# Patient Record
Sex: MALE | Race: WHITE | Employment: FULL TIME | URBAN - METROPOLITAN AREA
[De-identification: names, ages, dates, MRNs, and addresses within clinical notes are randomized per-mention and may not be internally consistent; named-entity substitution may affect disease eponyms.]

---

## 2021-07-28 ENCOUNTER — TELEPHONE (OUTPATIENT)
Dept: PSYCHOLOGY | Facility: CLINIC | Age: 51
End: 2021-07-28

## 2021-07-28 NOTE — TELEPHONE ENCOUNTER
[facility] Telephone Intake    Date:  2021  Client Name:  Ludin Morales  Preferred Name:       MRN:  [id number]   :  1970       Age:  51 year old     Presenting Problem / Reason for Assessment   (Clinical History &Symptoms):     Patient wishes to address relationship with pornography use.    Suggested Program:  CSB    Length of time experiencing Symptoms: Since 12 y/o    Referral Source:  Through another therapist    Follow Up:    Insurance Benefits to be evaluated.  Note will be entered when validated.    Patient wishes to be contacted regarding Insurance benefits: Yes  Please Verify Registration

## 2021-07-29 ASSESSMENT — ANXIETY QUESTIONNAIRES
7. FEELING AFRAID AS IF SOMETHING AWFUL MIGHT HAPPEN: MORE THAN HALF THE DAYS
5. BEING SO RESTLESS THAT IT IS HARD TO SIT STILL: SEVERAL DAYS
GAD7 TOTAL SCORE: 11
2. NOT BEING ABLE TO STOP OR CONTROL WORRYING: SEVERAL DAYS
4. TROUBLE RELAXING: SEVERAL DAYS
3. WORRYING TOO MUCH ABOUT DIFFERENT THINGS: SEVERAL DAYS
6. BECOMING EASILY ANNOYED OR IRRITABLE: MORE THAN HALF THE DAYS
1. FEELING NERVOUS, ANXIOUS, OR ON EDGE: NEARLY EVERY DAY

## 2021-07-29 ASSESSMENT — PATIENT HEALTH QUESTIONNAIRE - PHQ9: SUM OF ALL RESPONSES TO PHQ QUESTIONS 1-9: 12

## 2021-07-30 ASSESSMENT — ANXIETY QUESTIONNAIRES: GAD7 TOTAL SCORE: 11

## 2021-08-08 ENCOUNTER — HEALTH MAINTENANCE LETTER (OUTPATIENT)
Age: 51
End: 2021-08-08

## 2021-08-09 ENCOUNTER — VIRTUAL VISIT (OUTPATIENT)
Dept: PSYCHOLOGY | Facility: CLINIC | Age: 51
End: 2021-08-09
Payer: COMMERCIAL

## 2021-08-09 DIAGNOSIS — F41.1 GENERALIZED ANXIETY DISORDER: ICD-10-CM

## 2021-08-09 DIAGNOSIS — F91.8 CONDUCT DISORDER, UNDIFFERENTIATED TYPE: Primary | ICD-10-CM

## 2021-08-09 DIAGNOSIS — F34.1 DYSTHYMIA: ICD-10-CM

## 2021-08-09 PROCEDURE — 99207 PR NO CHARGE LOS: CPT | Performed by: PSYCHOLOGIST

## 2021-08-09 PROCEDURE — 90791 PSYCH DIAGNOSTIC EVALUATION: CPT | Mod: 95 | Performed by: PSYCHOLOGIST

## 2021-08-09 NOTE — PROGRESS NOTES
[facility]  Program in Human Sexuality  Department of Family Medicine & Community Health  [facility]  [address]               [place] [address]  Phone: [phone]  Fax: [phone]  [email or web address]     Diagnostic Assessment Interview       8/09/21     Client Name: Ludin Morales  YOB: 1970  Age: 51 year old  MRN: [id number]  Gender/Gender Identity: male  Treating Provider: Aniyah Carmona, PhD LP  Program: CSB  Type of Session: Diagnostic Assessment  Present in Session: Patient  Number of Minutes: 50    Video start time:  5:00  Video end time: 5:55    Telemedicine Visit: The patient's condition can be safely assessed and treated via synchronous audio and visual telemedicine encounter.      Reason for Telemedicine Visit: Patient has requested telehealth visit    Originating Site (Patient Location): Patient's home    Distant Site (Provider Location): [facility] Clinics: [facility]    Consent:  The patient/guardian has verbally consented to: the potential risks and benefits of telemedicine (video visit) versus in person care; bill my insurance or make self-payment for services provided; and responsibility for payment of non-covered services.     Mode of Communication:  Video Conference via Doxy    As the provider I attest to compliance with applicable laws and regulations related to telemedicine.         Reviewed confidentiality, informed consent, and relevant [facility] policies.    Referral Source: through a therapist.    Cultural/Spiritual/Yarsani Considerations:   Patient is a 51 year old year old individual assigned male at birth who identifies as male. He identifies as a heterosexual male.. Patient denied any other culturally relevant factors.    Chief Complaint/ History of Presenting Problem and Goals:  Patient's problems with out of control, driven, impulsive, compulsive sexual behavior began when he was 12 years old.  He feels that this has  caused major stress in his marriage and all past relationships.  He believes that this has caused him to be depressed and irritable.    Patient made the following efforts to change problematic behavior through therapy starting in 2018.  He is currently in individual and group therapy.       As long as he can remember has been interested in pornography.  Always thought it was normal and it was something that all men did.    But now after 10 years of marriage, he now feels that it has has negatively affected their relationship.    He believes it hurt previuos relationships.    When he is involve in pornography does not feel a sense of need for relationship.  HIs wife feels pushed away.    Lots of lies to her.  He feels so much shame.    Has not looked at pornography for a while and feels that - since 4-5 months.  Looked at it 2 months ago and stopped.  That is feeling good.    Had an emotional affair that he was having at work that ended in May.  Lasted 6 weeks.  Hugged 4 times.  Kissed 4 times.  He ended it - they both agreed.  Administrators know about it.  They have not  them.  Not concerned about harrassment claim.    That resulted in couples therapy.    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    He has learned about his triggers.  Wants to learn more.  He wants to look more at his his background and learn more about himself.    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      Current Significant Relationship/Partner information:  Patient reported that the partner is aware of the following problematic behaviors:  yes.  Patient reported the partner became aware of these concerns through catching him numerous times despite pledges and promises..   Patient reported that partner's reaction to becoming aware of these problematic behaviors is that it has distracted from their intimate relatoinship.     Patient reported that the partner is not aware of the following problematic behaviors: none.   Patient reported that they are open to having their partner join them in some upcoming sessions.  Patient reported that they believe their partner is open to joining them in some upcoming sessions.     Endorsed problematic behaviors include:   Anonymous/multiple sexual encounters: No  Compulsive Fixation on unattainable partner: No  Obsessive fantasizing: No  Multiple love affairs: No  Compulsive sexual expression with primary partner: No  Compulsive masturbation: Yes  Internet pornography: Yes  Other types of pornography: Yes  Chat rooms, social media sites: No  Personals ads: No  Bath houses: No  Peep/strip shows: No  Phone sex: No  Prostitution: No  Sex in public places: No    Any paraphilic behaviors:  Voyeurism: No  Exhibitionism: No  Frotteurism: No  Sexual Masochism: No  Sexual Sadism: No  Pedophilia: No  Cross dressing: No  Fetishism: No  Coercive sexual behavior: No    Patient denies sexual orientation concerns.    Patient denies sexual functioning concerns.   ________________________________________________________________  Mental Health History:   Patient has not had a history of hospitalizations for mental health.  Patient has not had a history of suicide attempts.   Patient has not had a history of suicidal ideation.   Patient has had a history of previous psychotherapy.       Saw a therapist in 2018 and saw her for 1/ 1/2 years.  Was not all that helpful.  Then started with current therapist for about 4 months.  Started couples therapy in April.  She is in individual therapy with someone else.     Patient worked on these  during these sessions.   The patient reported this was somewhat helpful.    Has been dx with some anxiety.  Was diagnosed with adhd.    Was taking ritalin.  Was not helpful.  Hard to focus, procratinated, forget things all the time.  Feels that he has had that since childhood.    Has been  on lexapro- and wellbutrin  He feels that is helpful.       Patient has not had a history of mental health concerns in his family.      Substance Use:   Patient denies using nicotine products.   Patient has been using alcohol. Typically drinks 3 drinks a week - maybe 4.  Increased since COVID.  Denies abuse..  The patient denies using any other substances.   Patient denies any substance abuse issues (see CAGE score).  Patient denies a history of substance use concerns in his family.    Medical History:   Patient receives primary medical care from Hugh Curiel at [facility] in [place], [place].   Patient has identified the following medical conditions: in good health. Has some back concerns.  Patient is currently prescribed nothing other than his psych meds.  Patient reported the following history of surgeries: both shoulders (arthritis), vasectomy.     Relationships/Social History  First marriage.  Met at college.   for about 4 years.  It was difficult marriage.  Was looking at a lot of pornography.  Confict with her family.  That ended in divorce and then met current wife.    Family History:   Father: Feels many issues stem from relationship with parents.  Was never there for me.  Oblivious to what was going . No emotion.  No caring.  Very little communication. Has very little relationship with father.    Mother: Feels the same way about his mother.  Has very little relationship with mother.    Siblings: Always felt like the balck sheep in the family.  Felt he didn't belong.  [place] that from siblings.  Brother two years older.  Older brother 4 years very little connection.    Youngest sister and brother  - estranged.      Children: 4  10 year old daughter together.  She has a 14 and 17 year  He has an 18 year old (lving with her mother).  Getting more difficulty at they get older.   Hard to connect with them.  They are aware of the affair that he had.  That has been hard on them.    The patient denies  "extramarital  relationships.   Acknowledges having a recent emotional affair.    Trauma History:  The patient denies a history of trauma.     Educational History:   Patient completed a bachelors degree in education.    Occupational history:    Current/most recent position: special .       FINANCIAL RESOURCES:   The patient's financial situation is frustrating.  Had to keep up with bills.      HOUSING/LIVING SITUATION:   The patient lives at home with his wife, children and step children..    Legal Issues:   Patient denies legal issues.        ______________________________________________________________________     CONCLUSIONS    Strengths and Vulnerabilities:   He is bright, articulate.  He struggles with paying attention and is not a good listener.  He struggles with honesty.    Symptoms:    decreased self-esteem/guilt  irritable  decreased concentration/indecision  rumination  dysphoria  worry/anxiety  distractible  compulsive  relationship problems  occupational problems  other: compulsive sexual behavior    See PHQ 9 and JORDON 7 Scores    Mental Status:   Appearance:  No apparent distress and Casually dressed  Behavior/relationship to examiner/demeanor:  Cooperative, Engaged and Pleasant  Orientation: Oriented to person, place, time and situation  Speech Rate:  Normal   Speech Spontaneity:  Normal  Mood:  \"okay\", friendly, dysphoric, distraught, anxious, nervous, apprehensive and fearful  Affect:  Appropriate/mood-congruent and Subdued  Thought Process (Associations):  Logical, Linear and Goal directed  Thought Content:  no evidence of suicidal or homicidal ideation  Abnormal Perception:  None  Attention/Concentration:  Normal  Language:  Intact  Insight:  Adequate  Judgment:  Fair        DSM-5 Diagnosis: 312.89 (F91.8) Other Specified Disruptive, Impulse Control, and Conduct Disorder (Hypersexual Disorder)        Conclusions/Recommendations/Initial Treatment Goals:   Based on the patient's report of " symptoms, patient meets criteria for compulsive sexual behavior disorder, dysthymia and generalized anxiety. . The patient's mental health concerns affect patient's ability to function in intimate relationships and have been causing clinically significant distress. The patient reports minimal drug/alcohol use. The patient is also struggling with relationship conflicts. Patient denies safety concerns. Based on the patient's reported symptoms and impact on functioning, the plan for the patient is:    Consider encouraging the partner to complete a diagnostic assessment and consider any recommended treatment.    Interactive Complexity  Communication difficulties present during the current psychiatric procedure included:    None     Aniyah Carmona, PhD, LP

## 2021-08-23 ENCOUNTER — VIRTUAL VISIT (OUTPATIENT)
Dept: PSYCHOLOGY | Facility: CLINIC | Age: 51
End: 2021-08-23
Payer: COMMERCIAL

## 2021-08-23 DIAGNOSIS — F41.1 GENERALIZED ANXIETY DISORDER: ICD-10-CM

## 2021-08-23 DIAGNOSIS — F98.8 ATTENTION DEFICIT DISORDER (ADD) WITHOUT HYPERACTIVITY: ICD-10-CM

## 2021-08-23 DIAGNOSIS — F91.8 CONDUCT DISORDER, UNDIFFERENTIATED TYPE: Primary | ICD-10-CM

## 2021-08-23 DIAGNOSIS — F34.1 DYSTHYMIA: ICD-10-CM

## 2021-08-23 PROCEDURE — 90837 PSYTX W PT 60 MINUTES: CPT | Mod: 95 | Performed by: PSYCHOLOGIST

## 2021-08-23 PROCEDURE — 99207 PR NO CHARGE LOS: CPT | Performed by: PSYCHOLOGIST

## 2021-08-23 NOTE — PROGRESS NOTES
[facility]     Case Progress Note      8/23/21    Client Name: Ludin Morales  YOB: 1970  MRN:  [id number]  Treating Provider: Aniyah Carmona, PhD LP  Type of Session: Individual  Present in Session: Patient and wife  Number of Minutes:  50    Video start time: 3:00  Video end time: 3:55    Telemedicine Visit: The patient's condition can be safely assessed and treated via synchronous audio and visual telemedicine encounter.      Reason for Telemedicine Visit: Services only offered telehealth    Originating Site (Patient Location): Patient's home    Distant Site (Provider Location): [facility] Clinics: [facility]    Consent:  The patient/guardian has verbally consented to: the potential risks and benefits of telemedicine (video visit) versus in person care; bill my insurance or make self-payment for services provided; and responsibility for payment of non-covered services.     Mode of Communication:  Video Conference via Doxyl    As the provider I attest to compliance with applicable laws and regulations related to telemedicine.        Current Symptoms/Status:  Patient's problems with out of control, driven, impulsive, compulsive sexual behavior began when he was 12 years old.  He feels that this has caused major stress in his marriage and all past relationships.  He believes that this has caused him to be depressed and irritable.    Patient made the following efforts to change problematic behavior through therapy starting in 2018.  He is currently in individual and group therapy.       As long as he can remember has been interested in pornography.  Always thought it was normal and it was something that all men did.    But now after 10 years of marriage, he now feels that it has has negatively affected their relationship.    He believes it hurt previuos relationships.    When he is involve in pornography does not feel a sense of need for relationship.  HIs wife feels pushed away.    Lots  of lies to her.  He feels so much shame.    Has not looked at pornography for a while and feels that - since 4-5 months.  Looked at it 2 months ago and stopped.  That is feeling good.    Had an emotional affair that he was having at work that ended in May.  Lasted 6 weeks.  Hugged 4 times.  Kissed 4 times.  He ended it - they both agreed.  Administrators know about it.  They have not  them.  Not concerned about harrassment claim.    That resulted in couples therapy.    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    He has learned about his triggers.  Wants to learn more.  He wants to look more at his his background and learn more about himself.    Lasted 6 weeks.  Hugged 4 times.  Kissed 4 times.  He ended it - they both agreed.  Administrators know about it.  They have not  them.  Not concerned about harrassment claim.    That resulted in couples therapy.    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    He has learned about his triggers.  Wants to learn more.  He wants to look more at his his background and learn more about himself.    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      Progress Toward Treatment Goals:   Feels that his behavior is under control.  Communication is better.    Intervention: Modality and Description:  Interpersonal, relapse prevention.  Further assessment with input from wife.    Response to Intervention:  Reviewed anxiety, depression and ADD symptoms.  Had been prescribed adderal and wellbutrin.  Did not like the effect of adderal - and so discontinued     Wife joined session.  Confirmed that he is doing better.  Still will like more communication to know more how things are going.     Assignment:  Complete redcap measures  Follow up with couples therapists.  More conversation!  Follow up session in 4-5  months.  Complete tx plan next time.    Interactive Complexity:  There are four specific communication difficulties that complicate the work of the primary psychiatric procedure.  Interactive complexity (+[id number]) may be reported when at least one of these difficulties is present.    Communication difficulties present during current the psychiatric procedure include:  1. None.    Diagnosis:  312.89 (F91.8) Other Specified Disruptive, Impulse Control, and Conduct Disorder (Hypersexual Disorder)    Attention Deficit Disorder.    Plan / Need for Future Services:  Return for therapy in 3-4 weeks.      Aniyah Carmona, PhD LP

## 2021-10-03 ENCOUNTER — HEALTH MAINTENANCE LETTER (OUTPATIENT)
Age: 51
End: 2021-10-03

## 2022-09-10 ENCOUNTER — HEALTH MAINTENANCE LETTER (OUTPATIENT)
Age: 52
End: 2022-09-10

## 2023-10-01 ENCOUNTER — HEALTH MAINTENANCE LETTER (OUTPATIENT)
Age: 53
End: 2023-10-01